# Patient Record
Sex: MALE | URBAN - METROPOLITAN AREA
[De-identification: names, ages, dates, MRNs, and addresses within clinical notes are randomized per-mention and may not be internally consistent; named-entity substitution may affect disease eponyms.]

---

## 2022-07-05 ENCOUNTER — APPOINTMENT (RX ONLY)
Dept: URBAN - METROPOLITAN AREA CLINIC 58 | Facility: CLINIC | Age: 52
Setting detail: DERMATOLOGY
End: 2022-07-05

## 2022-07-05 DIAGNOSIS — L40.0 PSORIASIS VULGARIS: ICD-10-CM | Status: WELL CONTROLLED

## 2022-07-05 DIAGNOSIS — Z71.89 OTHER SPECIFIED COUNSELING: ICD-10-CM

## 2022-07-05 PROCEDURE — ? COUNSELING

## 2022-07-05 PROCEDURE — ? TREATMENT REGIMEN

## 2022-07-05 PROCEDURE — 99203 OFFICE O/P NEW LOW 30 MIN: CPT

## 2022-07-05 PROCEDURE — ? ORDER TESTS

## 2022-07-05 ASSESSMENT — LOCATION SIMPLE DESCRIPTION DERM: LOCATION SIMPLE: SCALP

## 2022-07-05 ASSESSMENT — LOCATION ZONE DERM: LOCATION ZONE: SCALP

## 2022-07-05 ASSESSMENT — LOCATION DETAILED DESCRIPTION DERM: LOCATION DETAILED: LEFT CENTRAL PARIETAL SCALP

## 2022-07-05 NOTE — HPI: OTHER
Condition:: Psoriasis follow up as he is no longer seeing Dr. Cali. He is needing to have lab work to stay on consentyx but can not afford it. Also complains of a lesion on the back of the neck on the left side that’s been there for a couple months but is asymptomatic.
Please Describe Your Condition:: is a new patient who is being seen for a chief complaint of Psoriasis follow up as he is no longer seeing Dr. Montemayor at [facility]. He is needing to have lab work to stay on consentyx but can not afford it. He has a history of psoriasis and psoriatic arthritis. He’s stable on Cosentyx and ketoconazole shampoo and Fluocinonide solution, the latter primarily for his scalp. He got  and lost his insurance through his wife. That’s why he no longer goes to [facility] where he has been seen for psoriatic arthritis. He’s now getting the Cosentyx without cost from the [facility].\\n\\n[facility] felt he should come here for some ordering of blood tests.\\n\\nAlso complains of a lesion on the back of the neck on the left side that’s been there for a couple months but is asymptomatic.

## 2022-07-05 NOTE — PROCEDURE: TREATMENT REGIMEN
Action 3: Continue
Continue Regimen: Ketoconazole using as a shampoo as needed. Fluocinonide solution using twice daily to effected areas as needed. Cosentyx take as prescribed.
Detail Level: Zone